# Patient Record
Sex: MALE | Race: OTHER | Employment: OTHER | URBAN - METROPOLITAN AREA
[De-identification: names, ages, dates, MRNs, and addresses within clinical notes are randomized per-mention and may not be internally consistent; named-entity substitution may affect disease eponyms.]

---

## 2019-03-30 ENCOUNTER — HOSPITAL ENCOUNTER (INPATIENT)
Age: 39
LOS: 1 days | Discharge: HOME | DRG: 158 | End: 2019-03-31
Attending: EMERGENCY MEDICINE | Admitting: INTERNAL MEDICINE
Payer: SUBSIDIZED

## 2019-03-30 ENCOUNTER — APPOINTMENT (OUTPATIENT)
Dept: CT IMAGING | Age: 39
DRG: 158 | End: 2019-03-30
Attending: EMERGENCY MEDICINE
Payer: SUBSIDIZED

## 2019-03-30 DIAGNOSIS — L02.01 FACIAL ABSCESS: Primary | ICD-10-CM

## 2019-03-30 PROBLEM — D72.829 LEUKOCYTOSIS: Status: ACTIVE | Noted: 2019-03-30

## 2019-03-30 PROBLEM — K13.0 ABSCESS OF LIP: Status: ACTIVE | Noted: 2019-03-30

## 2019-03-30 LAB
ANION GAP SERPL CALC-SCNC: 8 MMOL/L (ref 5–15)
BASOPHILS # BLD: 0.1 K/UL (ref 0–0.1)
BASOPHILS NFR BLD: 0 % (ref 0–1)
BUN SERPL-MCNC: 13 MG/DL (ref 6–20)
BUN/CREAT SERPL: 13 (ref 12–20)
CALCIUM SERPL-MCNC: 9.5 MG/DL (ref 8.5–10.1)
CHLORIDE SERPL-SCNC: 106 MMOL/L (ref 97–108)
CO2 SERPL-SCNC: 26 MMOL/L (ref 21–32)
CREAT SERPL-MCNC: 1 MG/DL (ref 0.7–1.3)
DIFFERENTIAL METHOD BLD: ABNORMAL
EOSINOPHIL # BLD: 0.1 K/UL (ref 0–0.4)
EOSINOPHIL NFR BLD: 1 % (ref 0–7)
ERYTHROCYTE [DISTWIDTH] IN BLOOD BY AUTOMATED COUNT: 12.2 % (ref 11.5–14.5)
GLUCOSE SERPL-MCNC: 110 MG/DL (ref 65–100)
HCT VFR BLD AUTO: 50.1 % (ref 36.6–50.3)
HGB BLD-MCNC: 17 G/DL (ref 12.1–17)
IMM GRANULOCYTES # BLD AUTO: 0 K/UL (ref 0–0.04)
IMM GRANULOCYTES NFR BLD AUTO: 0 % (ref 0–0.5)
LYMPHOCYTES # BLD: 2.2 K/UL (ref 0.8–3.5)
LYMPHOCYTES NFR BLD: 15 % (ref 12–49)
MCH RBC QN AUTO: 30.9 PG (ref 26–34)
MCHC RBC AUTO-ENTMCNC: 33.9 G/DL (ref 30–36.5)
MCV RBC AUTO: 91.1 FL (ref 80–99)
MONOCYTES # BLD: 1.2 K/UL (ref 0–1)
MONOCYTES NFR BLD: 8 % (ref 5–13)
NEUTS SEG # BLD: 10.9 K/UL (ref 1.8–8)
NEUTS SEG NFR BLD: 76 % (ref 32–75)
NRBC # BLD: 0 K/UL (ref 0–0.01)
NRBC BLD-RTO: 0 PER 100 WBC
PLATELET # BLD AUTO: 313 K/UL (ref 150–400)
PMV BLD AUTO: 8.7 FL (ref 8.9–12.9)
POTASSIUM SERPL-SCNC: 4.3 MMOL/L (ref 3.5–5.1)
RBC # BLD AUTO: 5.5 M/UL (ref 4.1–5.7)
SODIUM SERPL-SCNC: 140 MMOL/L (ref 136–145)
WBC # BLD AUTO: 14.5 K/UL (ref 4.1–11.1)

## 2019-03-30 PROCEDURE — 96365 THER/PROPH/DIAG IV INF INIT: CPT

## 2019-03-30 PROCEDURE — 65270000029 HC RM PRIVATE

## 2019-03-30 PROCEDURE — 96367 TX/PROPH/DG ADDL SEQ IV INF: CPT

## 2019-03-30 PROCEDURE — 70487 CT MAXILLOFACIAL W/DYE: CPT

## 2019-03-30 PROCEDURE — 87205 SMEAR GRAM STAIN: CPT

## 2019-03-30 PROCEDURE — 74011250636 HC RX REV CODE- 250/636: Performed by: INTERNAL MEDICINE

## 2019-03-30 PROCEDURE — 99283 EMERGENCY DEPT VISIT LOW MDM: CPT

## 2019-03-30 PROCEDURE — 0CB03ZX EXCISION OF UPPER LIP, PERCUTANEOUS APPROACH, DIAGNOSTIC: ICD-10-PCS | Performed by: OTOLARYNGOLOGY

## 2019-03-30 PROCEDURE — 87185 SC STD ENZYME DETCJ PER NZM: CPT

## 2019-03-30 PROCEDURE — 87077 CULTURE AEROBIC IDENTIFY: CPT

## 2019-03-30 PROCEDURE — 85025 COMPLETE CBC W/AUTO DIFF WBC: CPT

## 2019-03-30 PROCEDURE — 74011000250 HC RX REV CODE- 250: Performed by: OTOLARYNGOLOGY

## 2019-03-30 PROCEDURE — 74011000258 HC RX REV CODE- 258: Performed by: INTERNAL MEDICINE

## 2019-03-30 PROCEDURE — 74011000258 HC RX REV CODE- 258: Performed by: EMERGENCY MEDICINE

## 2019-03-30 PROCEDURE — 80048 BASIC METABOLIC PNL TOTAL CA: CPT

## 2019-03-30 PROCEDURE — 74011636320 HC RX REV CODE- 636/320: Performed by: RADIOLOGY

## 2019-03-30 PROCEDURE — 36415 COLL VENOUS BLD VENIPUNCTURE: CPT

## 2019-03-30 PROCEDURE — 74011250636 HC RX REV CODE- 250/636: Performed by: EMERGENCY MEDICINE

## 2019-03-30 RX ORDER — LIDOCAINE HYDROCHLORIDE AND EPINEPHRINE 10; 10 MG/ML; UG/ML
1.5 INJECTION, SOLUTION INFILTRATION; PERINEURAL
Status: COMPLETED | OUTPATIENT
Start: 2019-03-30 | End: 2019-03-30

## 2019-03-30 RX ORDER — DEXAMETHASONE SODIUM PHOSPHATE 10 MG/ML
6 INJECTION INTRAMUSCULAR; INTRAVENOUS
Status: DISCONTINUED | OUTPATIENT
Start: 2019-03-30 | End: 2019-03-30

## 2019-03-30 RX ORDER — SODIUM CHLORIDE 0.9 % (FLUSH) 0.9 %
5-40 SYRINGE (ML) INJECTION AS NEEDED
Status: DISCONTINUED | OUTPATIENT
Start: 2019-03-30 | End: 2019-03-31 | Stop reason: HOSPADM

## 2019-03-30 RX ORDER — MORPHINE SULFATE 4 MG/ML
2 INJECTION INTRAVENOUS
Status: DISCONTINUED | OUTPATIENT
Start: 2019-03-30 | End: 2019-03-31 | Stop reason: HOSPADM

## 2019-03-30 RX ORDER — ENOXAPARIN SODIUM 100 MG/ML
40 INJECTION SUBCUTANEOUS EVERY 24 HOURS
Status: DISCONTINUED | OUTPATIENT
Start: 2019-03-30 | End: 2019-03-31 | Stop reason: HOSPADM

## 2019-03-30 RX ORDER — ACETAMINOPHEN 325 MG/1
650 TABLET ORAL
Status: DISCONTINUED | OUTPATIENT
Start: 2019-03-30 | End: 2019-03-31 | Stop reason: HOSPADM

## 2019-03-30 RX ORDER — HYDROCODONE BITARTRATE AND ACETAMINOPHEN 5; 325 MG/1; MG/1
1 TABLET ORAL
Status: DISCONTINUED | OUTPATIENT
Start: 2019-03-30 | End: 2019-03-31 | Stop reason: HOSPADM

## 2019-03-30 RX ORDER — SODIUM CHLORIDE 0.9 % (FLUSH) 0.9 %
5-40 SYRINGE (ML) INJECTION EVERY 8 HOURS
Status: DISCONTINUED | OUTPATIENT
Start: 2019-03-30 | End: 2019-03-31 | Stop reason: HOSPADM

## 2019-03-30 RX ORDER — SULFAMETHOXAZOLE AND TRIMETHOPRIM 800; 160 MG/1; MG/1
1 TABLET ORAL 2 TIMES DAILY
COMMUNITY
End: 2019-03-31

## 2019-03-30 RX ORDER — DEXAMETHASONE SODIUM PHOSPHATE 10 MG/ML
6 INJECTION INTRAMUSCULAR; INTRAVENOUS EVERY 8 HOURS
Status: COMPLETED | OUTPATIENT
Start: 2019-03-30 | End: 2019-03-31

## 2019-03-30 RX ORDER — ONDANSETRON 2 MG/ML
4 INJECTION INTRAMUSCULAR; INTRAVENOUS
Status: DISCONTINUED | OUTPATIENT
Start: 2019-03-30 | End: 2019-03-31 | Stop reason: HOSPADM

## 2019-03-30 RX ORDER — IBUPROFEN 800 MG/1
800 TABLET ORAL
COMMUNITY

## 2019-03-30 RX ORDER — NALOXONE HYDROCHLORIDE 0.4 MG/ML
0.4 INJECTION, SOLUTION INTRAMUSCULAR; INTRAVENOUS; SUBCUTANEOUS AS NEEDED
Status: DISCONTINUED | OUTPATIENT
Start: 2019-03-30 | End: 2019-03-31 | Stop reason: HOSPADM

## 2019-03-30 RX ORDER — SODIUM CHLORIDE 9 MG/ML
100 INJECTION, SOLUTION INTRAVENOUS CONTINUOUS
Status: DISCONTINUED | OUTPATIENT
Start: 2019-03-30 | End: 2019-03-31 | Stop reason: HOSPADM

## 2019-03-30 RX ADMIN — LIDOCAINE HYDROCHLORIDE,EPINEPHRINE BITARTRATE 15 MG: 10; .01 INJECTION, SOLUTION INFILTRATION; PERINEURAL at 15:03

## 2019-03-30 RX ADMIN — VANCOMYCIN HYDROCHLORIDE 2250 MG: 10 INJECTION, POWDER, LYOPHILIZED, FOR SOLUTION INTRAVENOUS at 15:42

## 2019-03-30 RX ADMIN — SODIUM CHLORIDE 100 ML/HR: 900 INJECTION, SOLUTION INTRAVENOUS at 20:54

## 2019-03-30 RX ADMIN — AMPICILLIN AND SULBACTAM 3 G: 2; 1 INJECTION, POWDER, FOR SOLUTION INTRAVENOUS at 14:33

## 2019-03-30 RX ADMIN — ENOXAPARIN SODIUM 40 MG: 40 INJECTION SUBCUTANEOUS at 20:57

## 2019-03-30 RX ADMIN — DEXAMETHASONE SODIUM PHOSPHATE 6 MG: 10 INJECTION, SOLUTION INTRAMUSCULAR; INTRAVENOUS at 16:20

## 2019-03-30 RX ADMIN — Medication 10 ML: at 21:03

## 2019-03-30 RX ADMIN — IOPAMIDOL 100 ML: 612 INJECTION, SOLUTION INTRAVENOUS at 13:35

## 2019-03-30 RX ADMIN — AMPICILLIN SODIUM AND SULBACTAM SODIUM 3 G: 2; 1 INJECTION, POWDER, FOR SOLUTION INTRAMUSCULAR; INTRAVENOUS at 20:54

## 2019-03-30 NOTE — PROGRESS NOTES
[facility]: MED RECONCILIATION Comments/Recommendations:  
Patient alert and oriented for medication reconciliation and had medication bottles for review. Patient seen at [facility] yesterday and prescribed ibuprofen 800mg TID PRN and Bactrim DS 1 tab BID. Patient took one dose of antibiotic yesterday. Confirmed NKDA and updated preferred pharmacy. Medications added:  
 
none Medications removed: 
 
none Medications adjusted: 
 
Ibuprofen 800mg TID PRN adjusted from 600mg q6h PRN Information obtained from: patient, medication bottles Allergies: Patient has no known allergies. Prior to Admission Medications:  
 
Medication Documentation Review Audit Reviewed by Clara Laird (Pharmacist) on 03/30/19 at [id number] Medication Sig Documenting Provider Last Dose Status Taking?  
ibuprofen (MOTRIN) 800 mg tablet Take 800 mg by mouth three (3) times daily as needed for Pain. Everton Mccoy MD 3/30/2019 AM Active Yes  
trimethoprim-sulfamethoxazole (BACTRIM DS) 160-800 mg per tablet Take 1 Tab by mouth two (2) times a day. Everton Mccoy MD 3/29/2019 Active Yes Med Note (CARMEN MELENDEZ   Sat Mar 30, 2019  3:55 PM) Prescribed 10 day regimen on 3/29/19, has taken one dose. Thank Payton Schlatter, PharmD, BCPS   Contact: [phone]

## 2019-03-30 NOTE — ED PROVIDER NOTES
45 y.o. male with no significant past medical history, who presents ambulatory to the ED accompanied by spouse, with chief complaint of mouth swelling. Pt complains of facial swelling around that mouth which started as \"inflamed gums\" 3 days ago and has worsened today. He also complains of pain at the upper lip which he describes as \"aching\" in quality and rates his current pain as 5/10 in intensity. Notes that his pain is worse with palpation. Pt reports that he went to [facility] last night and was given antibiotics and pain medication which he started taking last night. Pt specifically shortness of breath, sore throat, difficulty swallowing and fever. There are no other acute medical concerns at this time. PCP: No primary care provider on file. Note written by Mildred Koenig, as dictated by Joann Leon MD 12:15 PM 
 
The history is provided by the patient and medical records. No  was used. No past medical history on file. No past surgical history on file. No family history on file. Social History Socioeconomic History  Marital status: Not on file Spouse name: Not on file  Number of children: Not on file  Years of education: Not on file  Highest education level: Not on file Occupational History  Not on file Social Needs  Financial resource strain: Not on file  Food insecurity:  
  Worry: Not on file Inability: Not on file  Transportation needs:  
  Medical: Not on file Non-medical: Not on file Tobacco Use  Smoking status: Not on file Substance and Sexual Activity  Alcohol use: Not on file  Drug use: Not on file  Sexual activity: Not on file Lifestyle  Physical activity:  
  Days per week: Not on file Minutes per session: Not on file  Stress: Not on file Relationships  Social connections:  
  Talks on phone: Not on file Gets together: Not on file Attends [employer] service: Not on file Active member of club or organization: Not on file Attends meetings of clubs or organizations: Not on file Relationship status: Not on file  Intimate partner violence:  
  Fear of current or ex partner: Not on file Emotionally abused: Not on file Physically abused: Not on file Forced sexual activity: Not on file Other Topics Concern  Not on file Social History Narrative  Not on file ALLERGIES: Patient has no known allergies. Review of Systems Constitutional: Negative for fever. HENT: Positive for dental problem and facial swelling. Negative for sore throat and trouble swallowing. Respiratory: Negative for shortness of breath. All other systems reviewed and are negative. Vitals:  
 03/30/19 1214 BP: (!) 132/94 Pulse: (!) 103 Resp: 16 Temp: 98.4 °F (36.9 °C) SpO2: 99% Weight: 90.7 kg (200 lb) Height: 5' 7\" (1.702 m) Physical Exam  
Physical Examination: General appearance - alert, well appearing, and in no distress, oriented to person, place, and time and normal appearing weight Eyes - pupils equal and reactive, extraocular eye movements intact HEENT-swelling and induration to upper lip with erythema and swelling extending to left maxilla, no dental injury or swelling, no sublingual induration or tongue elevation, no throat swelling Neck - supple, no significant adenopathy, no nuchal rigidity or meningismus Chest - clear to auscultation, no wheezes, rales or rhonchi, symmetric air entry Heart - normal rate, regular rhythm, normal S1, S2, no murmurs, rubs, clicks or gallops Abdomen - soft, nontender, nondistended, no masses or organomegaly Back exam - full range of motion, no tenderness, palpable spasm or pain on motion Neurological - alert, oriented, normal speech, no focal findings or movement disorder noted Musculoskeletal - no joint tenderness, deformity or swelling Extremities - peripheral pulses normal, no pedal edema, no clubbing or cyanosis Skin - normal coloration and turgor, no rashes, no suspicious skin lesions noted MDM Number of Diagnoses or Management Options Facial abscess:  
  
Amount and/or Complexity of Data Reviewed Clinical lab tests: ordered and reviewed Tests in the radiology section of CPT®: ordered and reviewed Decide to obtain previous medical records or to obtain history from someone other than the patient: yes Obtain history from someone other than the patient: yes (wife) Review and summarize past medical records: yes Discuss the patient with other providers: yes (ENT) Independent visualization of images, tracings, or specimens: yes Patient Progress Patient progress: improved Procedures 2:15 PM 
Discussed with Dr. Prince Landaverde, ENT. Will come evaluate pt in ED. Recommends admission for IV abx. Updated pt and wife on test results and plan for admission.

## 2019-03-30 NOTE — ED NOTES
CONSULT NOTE: 
3:59 PM Slick Christie MD spoke with Dr. Paul Garay MD, Consult for Hospitalist.  Discussed available diagnostic tests and clinical findings. Dr. Negrita Roberts will evaluate the patient for admission to the hospital. 
 
3:59 PM 
Patient is being admitted to the hospital.  The results of their tests and reasons for their admission have been discussed with them and/or available family. They convey agreement and understanding for the need to be admitted and for their admission diagnosis.

## 2019-03-30 NOTE — ED TRIAGE NOTES
The patient began with pain in the upper gums 3 days ago, now complains of increased pain and significant swelling to the lips and face. Denies difficulty breathing or swallowing. States he took an unknown pain medication belonging to his wife before the swelling began.

## 2019-03-30 NOTE — ED NOTES
Patient continues with upper lip edema. No swelling of tongue noted. No c/o SOB voiced.  O2 sat at 100% RA

## 2019-03-30 NOTE — CONSULTS
CC:   Facial swelling    HPI:     Magdiel Joseph is a 45 y.o. male seen today in consultation at the request of the [address] [place] [address] ER for facial cellulitis and abscess. This is a patient with no significant past medical history, who presents ambulatory to the ED accompanied by spouse, with chief complaint of mouth swelling. Pt complains of facial swelling around that mouth which started as \"inflamed gums\" 3 days ago and has worsened today. He also complains of pain at the upper lip which he describes as \"aching\" in quality and rates his current pain as 5/10 in intensity. Notes that his pain is worse with palpation. Pt reports that he went to [facility] last night and was given antibiotics and pain medication which he started taking last night. Pt specifically shortness of breath, sore throat, difficulty swallowing and fever. There are no other acute medical concerns at this time. No past medical history on file. No past surgical history on file. No current facility-administered medications on file prior to encounter. Current Outpatient Medications on File Prior to Encounter   Medication Sig Dispense Refill    trimethoprim-sulfamethoxazole (BACTRIM DS) 160-800 mg per tablet Take 1 Tab by mouth two (2) times a day.  ibuprofen (MOTRIN) 600 mg tablet Take  by mouth every six (6) hours as needed for Pain. No Known Allergies  No family history on file. Social History     Tobacco Use    Smoking status: Not on file   Substance Use Topics    Alcohol use: Not on file    Drug use: Not on file         REVIEW OF SYSTEMS  A full 10 point review of systems was performed today. The review was non-pertinent other than the details already listed in the history of present illness.      Visit Vitals  BP (!) 132/94 (BP 1 Location: Right arm, BP Patient Position: At rest)   Pulse (!) 103   Temp 98.4 °F (36.9 °C)   Resp 16   Ht 5' 7\" (1.702 m)   Wt 90.7 kg (200 lb)   SpO2 99%   BMI 31.32 kg/m²        PHYSICAL EXAM  General:  No acute distress. Alert and oriented x 3. MSK:   Normal muscle bulk  Psych:  Mood and affect appropriate. Neuro:  CN II - XII grossly intact bilaterally. Eyes:  PERRL/EOMI, no nystagmus. ENT:   Upper lip edema and induration. Slightly on left cheek as well. Lymph:  Neck soft and supple without lymphadenopathy. Resp:   No audible stridor or wheezing. Skin:   Head and neck skin is without suspicious lesions. DATA REVIEW:  Lab Results   Component Value Date/Time    WBC 14.5 (H) 03/30/2019 12:27 PM    HGB 17.0 03/30/2019 12:27 PM    HCT 50.1 03/30/2019 12:27 PM    PLATELET 473 46/04/0717 12:27 PM    MCV 91.1 03/30/2019 12:27 PM      CT face dated 3/30/19:  I personally reviewed the scan which has the following pertinent findings:  9mm x 4mm x 4mm subperiosteal abscess just anterior to maxillary spine. PROCEDURE:  Aspiration upper lip abscess  Consent: informed written consent obtained  Local: 1% lidocaine with 1:100,000 parts epinephrine   Description: Upper lip was injected with 1mL of local as above. An 18 Ga needle was used to aspirate 0.5cc of purulence from the pre-maxillary spine area through the gingivolabial sulcus (transoral). Minimal bleeding. Well tolerated. Cultures sent. ASSESSMENT/PLAN:  44 yo M with upper lip and cheek cellulitis and small pre-septal abscess drained via aspiration transorally. Failed Bactrim. I recommend admission for IV antibiotics (suggest continuing unasyn and vancomycin). Follow up cultures and tailor antibiotics as able. Elevate head. I recommend steroids to speed resolution of edema (decadron 6mg q8h x 3 doses). I am courtesy staff and unable to admit so please consult hospitalist service. Notify me if worsening of symptoms. Concetta Abebe [facility], [address], [facility]  [address] [facility], [address]   (T) [phone]  (P) [phone]  (X) [phone]

## 2019-03-30 NOTE — H&P
[address] 
[phone] Admission History and Physical 
 
 
NAME:              Jah Kim :   1980 MRN:  [id number] PCP:  None Date:     3/30/2019 Chief  Complaint: Lip and facial swelling History Of Presenting Illness: Mr. Pérez Spears is a 45 y.o. male who is being admitted for abscess of lip. Mr. Pérez Spears presented to our Emergency Department today complaining of a progressively worsening upper lip pain and swelling over the past two days. He says he thought he had swollen gums for which he was seen at a [facility] where he was given Bactrim which he had only taken once. His pain hand swelling however has worsened. No fever or chills. No dysphagia, vomiting or headaches at this time. A maxillofacial CT done in the ED showed a diffuse soft tissue swelling of the patient's upper lip and nasal 
filtrum. There is a small subperiosteal fluid collection measuring 9 x 4 x 4 mm, along the anterior aspect of the maxilla at the level of the nasal crest. He was reviewed by Dr Jose L Cruz who did a bedside aspiration of the abscess. Due to the location of the abscess, he will be admitted for IV antibiotics and close monitoring. No Known Allergies Prior to Admission medications Medication Sig Start Date End Date Taking? Authorizing Provider  
trimethoprim-sulfamethoxazole (BACTRIM DS) 160-800 mg per tablet Take 1 Tab by mouth two (2) times a day. Yes Everton Mccoy MD  
ibuprofen (MOTRIN) 800 mg tablet Take 800 mg by mouth three (3) times daily as needed for Pain. Yes Nadine, MD Everton  
 
Past medical history: neg for DM, HTN, dyslipidemia Past surgical history: no prior surgery Social History Tobacco Use  Smoking status: Never Smoker  Smokeless tobacco: Never Used Substance Use Topics  Alcohol use: Never Frequency: Never Family History Problem Relation Age of Onset  Diabetes Neg Hx Review of Systems: 
Constitutional ROS: no fever, chills, rigors or night sweats Respiratory ROS: no cough, sputum, hemoptysis, dyspnea or pleuritic pain. Cardiovascular ROS: no chest pain, palpitations, orthopnea, PND or syncope Endocrine ROS: no polydispsia, polyuria, heat or cold intolerance or major weight change. Gastrointestinal ROS: no dysphagia, abdominal pain, nausea, vomiting or diarrhea Genito-Urinary ROS: no dysuria, frequency, hematuria, retention or flank pain Musculoskeletal ROS: no joint pain, swelling or muscular tenderness Neurological ROS: no headache, confusion, focal weakness or any other neurological symptoms Psychiatric ROS: no depression, anxiety, mood swings Dermatological ROS: no rash, pruritis, or urticaria Heme-Lymph ROS: no swollen glands, bleeding Examination: 
 
Constitutional:   
Visit Vitals /76 Pulse 92 Temp 98.6 °F (37 °C) Resp 20 Ht 5' 7\" (1.702 m) Wt 90.7 kg (200 lb) SpO2 100% BMI 31.32 kg/m² General:  Weak and ill looking patient in no acute distress Eyes: Pink conjunctivae, PERRLA with no discharge. Normal eye movements Ear, Nose, Mouth & Throat: No ottorrhea, rhinorrhea, non tender sinuses. Swollen upper lip. Respiratory:  No accessory muscle use, clear breath sounds without crackles or wheezes Cardiovascular:  No JVD or murmurs, regular and normal S1, S2 without thrills, bruits or peripheral edema. Capillary refil+, good distal pulses GI & :  Soft abdomen, non-tender, non-distended, normoactive bowel sounds with no palpable organomegaly Heme:  No cervical or axillary adenopathy. Musculoskeletal:  No cyanosis, clubbing, atrophy or deformities Skin:  No rashes, bruising or ulcers Neurological: Awake and alert, speech is clear, CNs 2-12 are grossly intact and otherwise non focal 
Psychiatric:  Has a good insight and is oriented x 3 
________________________________________________________________________ Data Review: 
 
Labs: 
 
Recent Labs  
  03/30/19 
1227 WBC 14.5* HGB 17.0 HCT 50.1  Recent Labs  
  03/30/19 
1227   
K 4.3  CO2 26 * BUN 13  
CREA 1.00  
CA 9.5 No components found for: [place] No results for input(s): PH, PCO2, PO2, HCO3, FIO2 in the last 72 hours. No results for input(s): INR in the last 72 hours. No lab exists for component: INREXT Radiological Studies:   
 
CT maxillofacial - reviewed I have also reviewed available old medical records. Assessment & Impression: Mr. Sarah Osborne is a 45 y.o. male being evaluated for:  
 
Principal Problem: 
  Abscess of lip (3/30/2019) Active Problems: 
  Leukocytosis (3/30/2019) Plan of management: 
 
Abscess of lip (3/30/2019): due to a small subperiosteal fluid collection measuring 9 x 4 x 4 mm, along the anterior aspect of the maxilla. Seen by ENT and he is sp aspiration. Admit to hospital. Start IV Unasyn and vancomycin for now and follow cultures. Continue IV decadron, pain control. Leukocytosis (3/30/2019): due to abscess. Treat as above and follow closely Code Status:  Full Surrogate decision maker: Family Risk of deterioration: high Total time spent for the care of the patient: 79 Minutes Care Plan discussed with: Patient, Family, Nursing Staff and ED physician Discussed:  Code Status, Care Plan and D/C Planning Prophylaxis:  Lovenox Probable Disposition:  Home w/Family 
        
___________________________________________________ Attending Physician: Megan López MD

## 2019-03-30 NOTE — PROGRESS NOTES
[facility] Dosing Services: 3/30/19 Pharmacist Renal Dosing Progress Note for Unasyn Physician Dr. Sd Izaguirre The following medication: Unasyn was automatically dose-adjusted per [facility] P&T Committee Protocol, with respect to renal function. Consult provided for this   45 y.o. , male , for the indication of SSTI. Pt Weight:  
Wt Readings from Last 1 Encounters:  
03/30/19 90.7 kg (200 lb) Previous Regimen Unasyn 3g IV every 8 hours Serum Creatinine Lab Results Component Value Date/Time Creatinine 1.00 03/30/2019 12:27 PM  
 
   
Creatinine Clearance Estimated Creatinine Clearance: 107.5 mL/min (based on SCr of 1 mg/dL). BUN Lab Results Component Value Date/Time BUN 13 03/30/2019 12:27 PM  
 
   
Dosage changed to: Unasyn 3g IV every 6 hours for CrCl of 218HP/RTG per P&T policy. Pharmacy to continue to monitor patient daily. Will make dosage adjustments based upon changing renal function. Signed Juanjose Last. Contact information:  [phone]

## 2019-03-30 NOTE — ED NOTES
TRANSFER - OUT REPORT: 
 
Verbal report given to Nito Bustillos RN(name) on [address] [facility] [address]  being transferred to [facility](unit) for routine progression of care Report consisted of patients Situation, Background, Assessment and  
Recommendations(SBAR). Information from the following report(s) ED Summary was reviewed with the receiving nurse. Lines:  
Peripheral IV 03/30/19 Right Antecubital (Active) Site Assessment Clean, dry, & intact 3/30/2019 12:26 PM  
Phlebitis Assessment 0 3/30/2019 12:26 PM  
Infiltration Assessment 0 3/30/2019 12:26 PM  
Dressing Status Clean, dry, & intact 3/30/2019 12:26 PM  
Dressing Type Tape;Transparent 3/30/2019 12:26 PM  
Hub Color/Line Status Pink;Flushed 3/30/2019 12:26 PM  
Action Taken Blood drawn 3/30/2019 12:26 PM  
Alcohol Cap Used Yes 3/30/2019 12:26 PM  
  
 
Opportunity for questions and clarification was provided.

## 2019-03-30 NOTE — PROGRESS NOTES
[address] Dosing Services: Antimicrobial Stewardship Progress Note Consult for antibiotic dosing of vancomycin by Dr. Elias Vergara Pharmacist reviewed antibiotic appropriateness for 45year old , male  for indication of SSTI/upper lip and cheek cellulitis with small pre-septal abscess (s/p drainage via aspiration) Day of Therapy 1 Plan: 
Vancomycin therapy: 
Start Vancomycin therapy, with loading dose of 2250 mg given @ 1542. Follow with maintenance dose of 1250 mg every 8 hours. Dose calculated to approximate a therapeutic trough of 10-15 mcg/mL. Last trough level / Plan for level: Trough prior to 4th dose (not ordered). Pharmacy to follow daily and will make changes to dose and/or frequency based on clinical status. Other Antimicrobial 
(not dosed by pharmacist) Unasyn 3g IV every 6 hours Cultures 3/30 Mouth: in process Serum Creatinine Lab Results Component Value Date/Time Creatinine 1.00 03/30/2019 12:27 PM  
 
   
Creatinine Clearance Estimated Creatinine Clearance: 107.5 mL/min (based on SCr of 1 mg/dL). Temp 98.4 °F (36.9 °C) WBC Lab Results Component Value Date/Time WBC 14.5 (H) 03/30/2019 12:27 PM  
 
   
H/H Lab Results Component Value Date/Time HGB 17.0 03/30/2019 12:27 PM  
 
  
 
Platelets Lab Results Component Value Date/Time PLATELET 562 44/20/4413 12:27 PM  
 
  
 
 
Pharmacist: Signed Juanjose Last Contact information: [phone]

## 2019-03-31 VITALS
OXYGEN SATURATION: 96 % | BODY MASS INDEX: 31.39 KG/M2 | DIASTOLIC BLOOD PRESSURE: 51 MMHG | HEART RATE: 94 BPM | RESPIRATION RATE: 16 BRPM | SYSTOLIC BLOOD PRESSURE: 117 MMHG | TEMPERATURE: 97.6 F | HEIGHT: 67 IN | WEIGHT: 200 LBS

## 2019-03-31 LAB
ALBUMIN SERPL-MCNC: 3.7 G/DL (ref 3.5–5)
ALBUMIN/GLOB SERPL: 1 {RATIO} (ref 1.1–2.2)
ALP SERPL-CCNC: 62 U/L (ref 45–117)
ALT SERPL-CCNC: 52 U/L (ref 12–78)
ANION GAP SERPL CALC-SCNC: 7 MMOL/L (ref 5–15)
AST SERPL-CCNC: 11 U/L (ref 15–37)
BASOPHILS # BLD: 0 K/UL (ref 0–0.1)
BASOPHILS NFR BLD: 0 % (ref 0–1)
BILIRUB SERPL-MCNC: 0.7 MG/DL (ref 0.2–1)
BUN SERPL-MCNC: 13 MG/DL (ref 6–20)
BUN/CREAT SERPL: 14 (ref 12–20)
CALCIUM SERPL-MCNC: 8.8 MG/DL (ref 8.5–10.1)
CHLORIDE SERPL-SCNC: 108 MMOL/L (ref 97–108)
CO2 SERPL-SCNC: 23 MMOL/L (ref 21–32)
CREAT SERPL-MCNC: 0.95 MG/DL (ref 0.7–1.3)
DIFFERENTIAL METHOD BLD: ABNORMAL
EOSINOPHIL # BLD: 0 K/UL (ref 0–0.4)
EOSINOPHIL NFR BLD: 0 % (ref 0–7)
ERYTHROCYTE [DISTWIDTH] IN BLOOD BY AUTOMATED COUNT: 11.9 % (ref 11.5–14.5)
GLOBULIN SER CALC-MCNC: 3.7 G/DL (ref 2–4)
GLUCOSE SERPL-MCNC: 155 MG/DL (ref 65–100)
HCT VFR BLD AUTO: 44.5 % (ref 36.6–50.3)
HGB BLD-MCNC: 15 G/DL (ref 12.1–17)
IMM GRANULOCYTES # BLD AUTO: 0 K/UL (ref 0–0.04)
IMM GRANULOCYTES NFR BLD AUTO: 0 % (ref 0–0.5)
LYMPHOCYTES # BLD: 1.2 K/UL (ref 0.8–3.5)
LYMPHOCYTES NFR BLD: 12 % (ref 12–49)
MCH RBC QN AUTO: 30.5 PG (ref 26–34)
MCHC RBC AUTO-ENTMCNC: 33.7 G/DL (ref 30–36.5)
MCV RBC AUTO: 90.6 FL (ref 80–99)
MONOCYTES # BLD: 0.4 K/UL (ref 0–1)
MONOCYTES NFR BLD: 4 % (ref 5–13)
NEUTS SEG # BLD: 8 K/UL (ref 1.8–8)
NEUTS SEG NFR BLD: 84 % (ref 32–75)
NRBC # BLD: 0 K/UL (ref 0–0.01)
NRBC BLD-RTO: 0 PER 100 WBC
PLATELET # BLD AUTO: 327 K/UL (ref 150–400)
PMV BLD AUTO: 8.9 FL (ref 8.9–12.9)
POTASSIUM SERPL-SCNC: 4.1 MMOL/L (ref 3.5–5.1)
PROT SERPL-MCNC: 7.4 G/DL (ref 6.4–8.2)
RBC # BLD AUTO: 4.91 M/UL (ref 4.1–5.7)
SODIUM SERPL-SCNC: 138 MMOL/L (ref 136–145)
WBC # BLD AUTO: 9.6 K/UL (ref 4.1–11.1)

## 2019-03-31 PROCEDURE — 85025 COMPLETE CBC W/AUTO DIFF WBC: CPT

## 2019-03-31 PROCEDURE — 94760 N-INVAS EAR/PLS OXIMETRY 1: CPT

## 2019-03-31 PROCEDURE — 80053 COMPREHEN METABOLIC PANEL: CPT

## 2019-03-31 PROCEDURE — 74011000258 HC RX REV CODE- 258: Performed by: INTERNAL MEDICINE

## 2019-03-31 PROCEDURE — 74011250636 HC RX REV CODE- 250/636: Performed by: INTERNAL MEDICINE

## 2019-03-31 PROCEDURE — 36415 COLL VENOUS BLD VENIPUNCTURE: CPT

## 2019-03-31 RX ORDER — DOXYCYCLINE 100 MG/1
100 TABLET ORAL 2 TIMES DAILY
Qty: 20 TAB | Refills: 0 | Status: SHIPPED | OUTPATIENT
Start: 2019-03-31 | End: 2019-04-10

## 2019-03-31 RX ORDER — AMOXICILLIN AND CLAVULANATE POTASSIUM 875; 125 MG/1; MG/1
1 TABLET, FILM COATED ORAL 2 TIMES DAILY
Qty: 20 TAB | Refills: 0 | Status: SHIPPED | OUTPATIENT
Start: 2019-03-31 | End: 2019-04-10

## 2019-03-31 RX ADMIN — DEXAMETHASONE SODIUM PHOSPHATE 6 MG: 10 INJECTION, SOLUTION INTRAMUSCULAR; INTRAVENOUS at 00:01

## 2019-03-31 RX ADMIN — VANCOMYCIN HYDROCHLORIDE 1250 MG: 10 INJECTION, POWDER, LYOPHILIZED, FOR SOLUTION INTRAVENOUS at 09:51

## 2019-03-31 RX ADMIN — VANCOMYCIN HYDROCHLORIDE 1250 MG: 10 INJECTION, POWDER, LYOPHILIZED, FOR SOLUTION INTRAVENOUS at 00:08

## 2019-03-31 RX ADMIN — AMPICILLIN SODIUM AND SULBACTAM SODIUM 3 G: 2; 1 INJECTION, POWDER, FOR SOLUTION INTRAMUSCULAR; INTRAVENOUS at 07:00

## 2019-03-31 RX ADMIN — AMPICILLIN SODIUM AND SULBACTAM SODIUM 3 G: 2; 1 INJECTION, POWDER, FOR SOLUTION INTRAMUSCULAR; INTRAVENOUS at 13:36

## 2019-03-31 RX ADMIN — Medication 10 ML: at 07:00

## 2019-03-31 RX ADMIN — VANCOMYCIN HYDROCHLORIDE 1250 MG: 10 INJECTION, POWDER, LYOPHILIZED, FOR SOLUTION INTRAVENOUS at 16:19

## 2019-03-31 RX ADMIN — SODIUM CHLORIDE 100 ML/HR: 900 INJECTION, SOLUTION INTRAVENOUS at 07:01

## 2019-03-31 RX ADMIN — DEXAMETHASONE SODIUM PHOSPHATE 6 MG: 10 INJECTION, SOLUTION INTRAMUSCULAR; INTRAVENOUS at 07:02

## 2019-03-31 RX ADMIN — AMPICILLIN SODIUM AND SULBACTAM SODIUM 3 G: 2; 1 INJECTION, POWDER, FOR SOLUTION INTRAMUSCULAR; INTRAVENOUS at 01:44

## 2019-03-31 RX ADMIN — Medication 10 ML: at 13:36

## 2019-03-31 NOTE — DISCHARGE SUMMARY
[address] [address]  [address] [place] [address]  Tel: [phone]    Physician Discharge Summary    Patient ID:    Gio Arriaga  Age:              45 y.o.    : 1980  MRN:             [id number]     PCP: None     Admit date: 3/30/2019    Discharge date: 3/31/2019    Principal admission Diagnosis:   Abscess of lip [K13.0]    Discharge Diagnoses:  Principal Problem:    Abscess of lip (3/30/2019)    Leukocytosis (3/30/2019)    Consults: ENT    Hospital Course:     Mr. Felipe Duverney is a 45 y.o. admitted to [facility] and treated for an abscess of lip. CT maxillofacial area showed a small subperiosteal fluid collection measuring 9 x 4 x 4 mm, along the anterior aspect of the maxilla. Seen by ENT and he is sp aspiration. Dramatically improved overnight. Cultures isolating few Gram positive cocci. I have discussed with Dr Linda Albarran and will discharge home on Augmentin and Doxycycline. Follow up with him. Discussed with the patient who understands the instructions. Discharge Exam:    Visit Vitals  /65 (BP 1 Location: Left arm)   Pulse 76   Temp 97.4 °F (36.3 °C)   Resp 12   Ht 5' 7\" (1.702 m)   Wt 90.7 kg (200 lb)   SpO2 97%   BMI 31.32 kg/m²     General: well looking and stable patient in no acute distress  Pulm: clear breath sounds without wheezes  Card: no murmurs, normal S1, S2 without thrills, bruits   Abd:    soft, non-tender, normoactive bowel sounds  Skin: no rashes or ulcers, skin turgor is good  Neuro: awake, alert and has a non focal     Activity: Activity as tolerated    Diet: Regular Diet    Current Discharge Medication List      START taking these medications    Details   amoxicillin-clavulanate (AUGMENTIN) 875-125 mg per tablet Take 1 Tab by mouth two (2) times a day for 10 days.   Qty: 20 Tab, Refills: 0      doxycycline (ADOXA) 100 mg tablet Take 1 Tab by mouth two (2) times a day for 10 days.  Qty: 20 Tab, Refills: 0         CONTINUE these medications which have NOT CHANGED    Details   ibuprofen (MOTRIN) 800 mg tablet Take 800 mg by mouth three (3) times daily as needed for Pain. STOP taking these medications       trimethoprim-sulfamethoxazole (BACTRIM DS) 160-800 mg per tablet Comments:   Reason for Stopping: Follow-up Information     Follow up With Specialties Details Why Shelia Sosa MD Otolaryngology Call to the scheduled follow up and review after completing antibiotics or if symptoms worsen [address]  [phone]            Follow-up tests or labs: None    Discharge Condition: Stable    Disposition: home    Time taken to arrange discharge:  35 minutes.     Signed:  Xochitl Mercer MD     [facility]  3/31/2019   9:53 AM

## 2019-03-31 NOTE — PROGRESS NOTES
I have reviewed discharge instructions with the patient and spouse. The patient and spouse verbalized understanding. Patient was given two prescriptions. Patient was given the opportunity to ask questions and voice concerns.

## 2019-03-31 NOTE — PROGRESS NOTES
POD 1 I&D C/o less pain. Seems swelling has improved. On Vancomycin Visit Vitals /65 (BP 1 Location: Left arm) Pulse 76 Temp 97.4 °F (36.3 °C) Resp 12 Ht 5' 7\" (1.702 m) Wt 90.7 kg (200 lb) SpO2 97% BMI 31.32 kg/m² Less induration and edema. Less tenderness Cultures: GPCs A/p: 
Improved after I&D. Likely MRSA. Failed bactrim before but responding to vanc. Hopefully will be susc to clinda and can transition for outpatient treatment. F/u culture results and complete 10 day course of antibiotics. Will have Dr. Nadya Kinney or Bertin Yin see him on Monday if still in house. Myranda Guzman, [address] [facility], [facility]  
[address] [facility], [address]  
(S) [phone]  (A) [phone]  (F) [phone]

## 2019-03-31 NOTE — DISCHARGE INSTRUCTIONS
HOSPITALIST DISCHARGE INSTRUCTIONS    NAME: Benjamin Valencia   :  1980   MRN:  [id number]     Date:     3/31/2019    ADMIT DATE: 3/30/2019     DISCHARGE DATE: 3/31/2019     PRINCIPAL ADMITTING DIAGNOSIS:  Abscess of lip [K13.0]    DISCHARGE DIAGNOSES:  Principal Problem:    Abscess of lip (3/30/2019)    Leukocytosis (3/30/2019)    MEDICATIONS:    · It is important that medications are taken exactly as they are prescribed on the discharge medication instructions and keep them your  in the bottles provided by the pharmacist.   · Keep a list of the medication names, dosages, and times to be taken at all times. · Do not take other medications without consulting your doctor. Recommended diet:  Regular Diet    Recommended activity: Activity as tolerated    Post discharge care:    Notify follow up health care provider or return to the emergency department if you cannot get hold of your doctor if you feel worse or experience symptoms similar to those that brought you to hospital    Follow-up Information     Follow up With Specialties Details Why Contact Info    Lori hSah MD Otolaryngology Call to the scheduled follow up and review after completing antibiotics or if symptoms worsen [address] [phone]            Information obtained by :  I understand that if any problems occur once I am at home I am to contact my physician and I understand and acknowledge receipt of the instructions indicated above.                                                                                                                                            Physician's or R.N.'s Signature                                                                  Date/Time                                                                                                                                              Patient or Representative Signature Date/Time

## 2019-04-03 LAB
BACTERIA SPEC CULT: ABNORMAL
GRAM STN SPEC: ABNORMAL
SERVICE CMNT-IMP: ABNORMAL